# Patient Record
Sex: MALE | Race: WHITE | NOT HISPANIC OR LATINO | Employment: PART TIME | URBAN - NONMETROPOLITAN AREA
[De-identification: names, ages, dates, MRNs, and addresses within clinical notes are randomized per-mention and may not be internally consistent; named-entity substitution may affect disease eponyms.]

---
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
## 2022-09-07 ENCOUNTER — HOSPITAL ENCOUNTER (EMERGENCY)
Facility: OTHER | Age: 22
Discharge: HOME | End: 2022-09-07
Payer: COMMERCIAL

## 2022-09-07 VITALS
OXYGEN SATURATION: 97 % | HEART RATE: 90 BPM | BODY MASS INDEX: 27.69 KG/M2 | HEIGHT: 71 IN | SYSTOLIC BLOOD PRESSURE: 106 MMHG | RESPIRATION RATE: 16 BRPM | WEIGHT: 197.8 LBS | DIASTOLIC BLOOD PRESSURE: 71 MMHG | TEMPERATURE: 98.2 F

## 2022-09-07 NOTE — ED TRIAGE NOTES
Pt states that he is not feeling well.  Pt reports a migraine an abdominal nina.  Pt reports vomiting last night.  The other symptoms started this morning.     Triage Assessment     Row Name 09/07/22 1640       Triage Assessment (Adult)    Airway WDL WDL       Respiratory WDL    Respiratory WDL WDL       Skin Circulation/Temperature WDL    Skin Circulation/Temperature WDL WDL       Cardiac WDL    Cardiac WDL WDL       Peripheral/Neurovascular WDL    Peripheral Neurovascular WDL WDL

## 2023-03-13 ENCOUNTER — OFFICE VISIT (OUTPATIENT)
Dept: FAMILY MEDICINE | Facility: OTHER | Age: 23
End: 2023-03-13
Attending: FAMILY MEDICINE
Payer: COMMERCIAL

## 2023-03-13 VITALS
BODY MASS INDEX: 30.32 KG/M2 | SYSTOLIC BLOOD PRESSURE: 132 MMHG | RESPIRATION RATE: 16 BRPM | OXYGEN SATURATION: 98 % | DIASTOLIC BLOOD PRESSURE: 78 MMHG | HEART RATE: 82 BPM | TEMPERATURE: 97.9 F | WEIGHT: 217.4 LBS

## 2023-03-13 DIAGNOSIS — E66.09 CLASS 1 OBESITY DUE TO EXCESS CALORIES WITHOUT SERIOUS COMORBIDITY WITH BODY MASS INDEX (BMI) OF 30.0 TO 30.9 IN ADULT: ICD-10-CM

## 2023-03-13 DIAGNOSIS — Z72.0 VAPES NICOTINE CONTAINING SUBSTANCE: ICD-10-CM

## 2023-03-13 DIAGNOSIS — R07.89 ATYPICAL CHEST PAIN: Primary | ICD-10-CM

## 2023-03-13 DIAGNOSIS — F41.9 ANXIETY: ICD-10-CM

## 2023-03-13 DIAGNOSIS — E66.811 CLASS 1 OBESITY DUE TO EXCESS CALORIES WITHOUT SERIOUS COMORBIDITY WITH BODY MASS INDEX (BMI) OF 30.0 TO 30.9 IN ADULT: ICD-10-CM

## 2023-03-13 DIAGNOSIS — Z59.9 FINANCIAL PROBLEMS: ICD-10-CM

## 2023-03-13 LAB
D DIMER PPP FEU-MCNC: 0.63 UG/ML FEU (ref 0–0.5)
ERYTHROCYTE [DISTWIDTH] IN BLOOD BY AUTOMATED COUNT: 11.8 % (ref 10–15)
HCT VFR BLD AUTO: 43.1 % (ref 40–53)
HGB BLD-MCNC: 15.2 G/DL (ref 13.3–17.7)
MCH RBC QN AUTO: 30.2 PG (ref 26.5–33)
MCHC RBC AUTO-ENTMCNC: 35.3 G/DL (ref 31.5–36.5)
MCV RBC AUTO: 86 FL (ref 78–100)
PLATELET # BLD AUTO: 222 10E3/UL (ref 150–450)
RBC # BLD AUTO: 5.04 10E6/UL (ref 4.4–5.9)
TSH SERPL DL<=0.005 MIU/L-ACNC: 2.02 UIU/ML (ref 0.3–4.2)
WBC # BLD AUTO: 6.3 10E3/UL (ref 4–11)

## 2023-03-13 PROCEDURE — 36415 COLL VENOUS BLD VENIPUNCTURE: CPT | Mod: ZL | Performed by: FAMILY MEDICINE

## 2023-03-13 PROCEDURE — 93010 ELECTROCARDIOGRAM REPORT: CPT | Performed by: INTERNAL MEDICINE

## 2023-03-13 PROCEDURE — G0463 HOSPITAL OUTPT CLINIC VISIT: HCPCS

## 2023-03-13 PROCEDURE — 85379 FIBRIN DEGRADATION QUANT: CPT | Mod: ZL | Performed by: FAMILY MEDICINE

## 2023-03-13 PROCEDURE — 84443 ASSAY THYROID STIM HORMONE: CPT | Mod: ZL | Performed by: FAMILY MEDICINE

## 2023-03-13 PROCEDURE — 85027 COMPLETE CBC AUTOMATED: CPT | Mod: ZL | Performed by: FAMILY MEDICINE

## 2023-03-13 PROCEDURE — 99215 OFFICE O/P EST HI 40 MIN: CPT | Performed by: FAMILY MEDICINE

## 2023-03-13 PROCEDURE — 93005 ELECTROCARDIOGRAM TRACING: CPT | Performed by: FAMILY MEDICINE

## 2023-03-13 SDOH — ECONOMIC STABILITY - INCOME SECURITY: PROBLEM RELATED TO HOUSING AND ECONOMIC CIRCUMSTANCES, UNSPECIFIED: Z59.9

## 2023-03-13 ASSESSMENT — ANXIETY QUESTIONNAIRES
GAD7 TOTAL SCORE: 1
IF YOU CHECKED OFF ANY PROBLEMS ON THIS QUESTIONNAIRE, HOW DIFFICULT HAVE THESE PROBLEMS MADE IT FOR YOU TO DO YOUR WORK, TAKE CARE OF THINGS AT HOME, OR GET ALONG WITH OTHER PEOPLE: NOT DIFFICULT AT ALL
3. WORRYING TOO MUCH ABOUT DIFFERENT THINGS: NOT AT ALL
1. FEELING NERVOUS, ANXIOUS, OR ON EDGE: NOT AT ALL
5. BEING SO RESTLESS THAT IT IS HARD TO SIT STILL: NOT AT ALL
GAD7 TOTAL SCORE: 1
7. FEELING AFRAID AS IF SOMETHING AWFUL MIGHT HAPPEN: NOT AT ALL
8. IF YOU CHECKED OFF ANY PROBLEMS, HOW DIFFICULT HAVE THESE MADE IT FOR YOU TO DO YOUR WORK, TAKE CARE OF THINGS AT HOME, OR GET ALONG WITH OTHER PEOPLE?: NOT DIFFICULT AT ALL
7. FEELING AFRAID AS IF SOMETHING AWFUL MIGHT HAPPEN: NOT AT ALL
4. TROUBLE RELAXING: NOT AT ALL
6. BECOMING EASILY ANNOYED OR IRRITABLE: SEVERAL DAYS
GAD7 TOTAL SCORE: 1
2. NOT BEING ABLE TO STOP OR CONTROL WORRYING: NOT AT ALL

## 2023-03-13 ASSESSMENT — PATIENT HEALTH QUESTIONNAIRE - PHQ9
SUM OF ALL RESPONSES TO PHQ QUESTIONS 1-9: 1
10. IF YOU CHECKED OFF ANY PROBLEMS, HOW DIFFICULT HAVE THESE PROBLEMS MADE IT FOR YOU TO DO YOUR WORK, TAKE CARE OF THINGS AT HOME, OR GET ALONG WITH OTHER PEOPLE: NOT DIFFICULT AT ALL
SUM OF ALL RESPONSES TO PHQ QUESTIONS 1-9: 1

## 2023-03-13 ASSESSMENT — ENCOUNTER SYMPTOMS: NERVOUS/ANXIOUS: 1

## 2023-03-13 ASSESSMENT — PAIN SCALES - GENERAL: PAINLEVEL: NO PAIN (0)
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
## 2023-03-13 NOTE — NURSING NOTE
"Chief Complaint   Patient presents with     Chest Pain     On and off for 10 years      Anxiety     Patient is here for chest pain that he has had on and off for 10 years. He states since he was taken off medication for stress he has had chest pain. He was on medication over 5 years ago so it is not on his medication history.     Initial /78   Pulse 82   Temp 97.9  F (36.6  C) (Tympanic)   Resp 16   Wt 98.6 kg (217 lb 6.4 oz)   SpO2 98%   BMI 30.32 kg/m   Estimated body mass index is 30.32 kg/m  as calculated from the following:    Height as of 9/7/22: 1.803 m (5' 11\").    Weight as of this encounter: 98.6 kg (217 lb 6.4 oz).  Medication Reconciliation: complete    Diandra De La Paz CMA       FOOD SECURITY SCREENING QUESTIONS:    The next two questions are to help us understand your food security.  If you are feeling you need any assistance in this area, we have resources available to support you today.    Hunger Vital Signs:  Within the past 12 months we worried whether our food would run out before we got money to buy more. Never  Within the past 12 months the food we bought just didn't last and we didn't have money to get more. Never  Diandra De La Paz CMA,LPN on 3/13/2023 at 2:28 PM      "

## 2023-03-13 NOTE — LETTER
"March 13, 2023      Umair Kaba  [address]  [place] [facility] [place] [address]        Dear Umair,     Here is some additional information tactics that can be helpful for management of anxiety.  I would also suggest that you and your fiancé look at the phone lucien called \"Calm\" for some anxiety management strategies that you can do together.                Sincerely,        YAN DASILVA MD          "

## 2023-03-14 LAB
ATRIAL RATE - MUSE: 81 BPM
DIASTOLIC BLOOD PRESSURE - MUSE: NORMAL MMHG
INTERPRETATION ECG - MUSE: NORMAL
P AXIS - MUSE: 46 DEGREES
PR INTERVAL - MUSE: 150 MS
QRS DURATION - MUSE: 116 MS
QT - MUSE: 370 MS
QTC - MUSE: 429 MS
R AXIS - MUSE: 56 DEGREES
SYSTOLIC BLOOD PRESSURE - MUSE: NORMAL MMHG
T AXIS - MUSE: 62 DEGREES
VENTRICULAR RATE- MUSE: 81 BPM

## 2025-04-17 ENCOUNTER — HOSPITAL ENCOUNTER (EMERGENCY)
Facility: OTHER | Age: 25
Discharge: HOME | End: 2025-04-17
Attending: FAMILY MEDICINE

## 2025-04-17 VITALS
RESPIRATION RATE: 16 BRPM | BODY MASS INDEX: 30.8 KG/M2 | HEIGHT: 71 IN | TEMPERATURE: 98.7 F | DIASTOLIC BLOOD PRESSURE: 56 MMHG | WEIGHT: 220 LBS | SYSTOLIC BLOOD PRESSURE: 100 MMHG | OXYGEN SATURATION: 98 % | HEART RATE: 132 BPM
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
## 2025-04-17 DIAGNOSIS — T30.0: ICD-10-CM

## 2025-04-17 PROCEDURE — 99282 EMERGENCY DEPT VISIT SF MDM: CPT | Performed by: FAMILY MEDICINE

## 2025-04-17 PROCEDURE — 99283 EMERGENCY DEPT VISIT LOW MDM: CPT | Performed by: FAMILY MEDICINE

## 2025-04-17 PROCEDURE — 250N000013 HC RX MED GY IP 250 OP 250 PS 637: Performed by: FAMILY MEDICINE

## 2025-04-17 RX ORDER — IBUPROFEN 600 MG/1
600 TABLET, FILM COATED ORAL EVERY 6 HOURS PRN
COMMUNITY
Start: 2025-04-17

## 2025-04-17 RX ORDER — IBUPROFEN 400 MG/1
800 TABLET, FILM COATED ORAL ONCE
Status: COMPLETED | OUTPATIENT
Start: 2025-04-17 | End: 2025-04-17

## 2025-04-17 RX ADMIN — IBUPROFEN 800 MG: 400 TABLET, FILM COATED ORAL at 01:13

## 2025-04-17 ASSESSMENT — COLUMBIA-SUICIDE SEVERITY RATING SCALE - C-SSRS
2. HAVE YOU ACTUALLY HAD ANY THOUGHTS OF KILLING YOURSELF IN THE PAST MONTH?: NO
6. HAVE YOU EVER DONE ANYTHING, STARTED TO DO ANYTHING, OR PREPARED TO DO ANYTHING TO END YOUR LIFE?: NO
1. IN THE PAST MONTH, HAVE YOU WISHED YOU WERE DEAD OR WISHED YOU COULD GO TO SLEEP AND NOT WAKE UP?: NO

## 2025-04-17 ASSESSMENT — ENCOUNTER SYMPTOMS
CHEST TIGHTNESS: 0
CHILLS: 0
FEVER: 0
SHORTNESS OF BREATH: 0

## 2025-04-17 ASSESSMENT — ACTIVITIES OF DAILY LIVING (ADL): ADLS_ACUITY_SCORE: 41

## 2025-04-17 NOTE — ED TRIAGE NOTES
Pt arrives from home with c/o redness on arms, legs and torso. Pt states he was at the [facility]anning [facility] for the first time for 20 minutes and pain has gotten worse since being home.

## 2025-04-17 NOTE — ED PROVIDER NOTES
History     Chief Complaint   Patient presents with    Burn     Generalized sunburn     HPI  Umair Kaba is a 24 year old male who presents to the emergency department with redness on arms legs torso, Monday he went into the [facility] for the first time and spent 20 minutes in there.  He is fair skinned.  Prone to sunburn.  No allergies, no history of hives or skin reactions.  No new medications.  Redness is only on exposed areas    Reviewed nurses notes below  Pt arrives from home with c/o redness on arms, legs and torso. Pt states he was at the [facility] for the first time for 20 minutes and pain has gotten worse since being home.   Allergies:  No Known Allergies    Problem List:    Patient Active Problem List    Diagnosis Date Noted    Behavior concern 01/17/2011     Priority: Medium    ADHD 09/10/2009     Priority: Medium     Overview:   Meds used:  ritalin, concerta, straterra, clonidine, adderall, guanfacine,   wellbutrin          Past Medical History:    Past Medical History:   Diagnosis Date    Attention-deficit hyperactivity disorder        Past Surgical History:    Past Surgical History:   Procedure Laterality Date    CIRCUMCISION      No Comments Provided       Family History:    Family History   Problem Relation Age of Onset    Obesity Mother         Obesity, gets chest pains occassionally.    Heart Disease Maternal Grandfather     Diabetes Type 1 Maternal Half-Brother        Social History:  Marital Status:  Single [1]  Social History     Tobacco Use    Smoking status: Every Day     Types: Vaping Device    Smokeless tobacco: Never   Vaping Use    Vaping status: Every Day    Substances: Nicotine, 30%   Substance Use Topics    Alcohol use: No    Drug use: No        Medications:    ibuprofen (ADVIL/MOTRIN) 600 MG tablet          Review of Systems   Constitutional:  Negative for chills and fever.   Respiratory:  Negative for chest tightness and shortness of breath.        Physical Exam   BP:  "100/56  Pulse: (!) 132  Temp: 98.7  F (37.1  C)  Resp: 16  Height: 180.3 cm (5' 11\")  Weight: 99.8 kg (220 lb)      Physical Exam  Vitals and nursing note reviewed.   Constitutional:       Appearance: Normal appearance.   Cardiovascular:      Rate and Rhythm: Normal rate.   Pulmonary:      Effort: Pulmonary effort is normal.   Musculoskeletal:      Right lower leg: No edema.      Left lower leg: No edema.   Skin:     Findings: Erythema present.   Neurological:      General: No focal deficit present.      Mental Status: He is alert.       Erythema without any blistering or peeling and ultraviolet light exposed areas.  No hives, pustules, blisters, bulla, wheals or papules.  No significant induration    Serial assessments in the ED, no progression of rash.  After calming down in the ED and monitoring pain control his pulse rate improved to 101 on my repeat exam    No results found for this or any previous visit (from the past 24 hours).    Medications   ibuprofen (ADVIL/MOTRIN) tablet 800 mg (800 mg Oral $Given 4/17/25 0113)       Assessments & Plan (with Medical Decision Making)     I have reviewed the nursing notes.    I have reviewed the findings, diagnosis, plan and need for follow up with the patient.    Medical Decision Making  The patient's presentation was of low complexity (an acute and uncomplicated illness or injury).    The patient's evaluation involved:  history and exam without other MDM data elements    The patient's management necessitated moderate risk (prescription drug management including medications given in the ED).    He was given information on sunburn and tanning beds.  Discussed other photo sensitivities that are not necessarily burns such as photodermatitis, sun induced hives etc.  He did not have an appearance consistent with these clinical entities.  Encouraged him to return for reassessment however with development of large boils, bullae, hives or significant blistering.  Scheduled " ibuprofen, may use aloe and calamine as well.  Patient verbalized understanding    New Prescriptions    IBUPROFEN (ADVIL/MOTRIN) 600 MG TABLET    Take 1 tablet (600 mg) by mouth every 6 hours as needed for moderate pain, mild pain or inflammatory pain.       Final diagnoses:   Burn of skin due to exposure to artificial ultraviolet radiation       4/17/2025   [facility]  04/17/25 0592

## 2025-07-06 ENCOUNTER — APPOINTMENT (OUTPATIENT)
Dept: GENERAL RADIOLOGY | Facility: OTHER | Age: 25
End: 2025-07-06
Attending: EMERGENCY MEDICINE

## 2025-07-06 ENCOUNTER — HOSPITAL ENCOUNTER (EMERGENCY)
Facility: OTHER | Age: 25
Discharge: HOME | End: 2025-07-06
Attending: EMERGENCY MEDICINE

## 2025-07-06 VITALS
SYSTOLIC BLOOD PRESSURE: 149 MMHG | RESPIRATION RATE: 20 BRPM | OXYGEN SATURATION: 96 % | TEMPERATURE: 97.7 F | WEIGHT: 223 LBS | BODY MASS INDEX: 31.1 KG/M2 | DIASTOLIC BLOOD PRESSURE: 84 MMHG | HEART RATE: 75 BPM

## 2025-07-06 DIAGNOSIS — S93.401A SPRAIN OF RIGHT ANKLE, UNSPECIFIED LIGAMENT, INITIAL ENCOUNTER: ICD-10-CM

## 2025-07-06 PROCEDURE — 73610 X-RAY EXAM OF ANKLE: CPT | Mod: RT

## 2025-07-06 PROCEDURE — 73610 X-RAY EXAM OF ANKLE: CPT | Mod: 26 | Performed by: RADIOLOGY

## 2025-07-06 PROCEDURE — 99283 EMERGENCY DEPT VISIT LOW MDM: CPT | Performed by: EMERGENCY MEDICINE

## 2025-07-06 PROCEDURE — 73630 X-RAY EXAM OF FOOT: CPT | Mod: RT

## 2025-07-06 PROCEDURE — 73630 X-RAY EXAM OF FOOT: CPT | Mod: 26 | Performed by: RADIOLOGY

## 2025-07-06 PROCEDURE — 250N000013 HC RX MED GY IP 250 OP 250 PS 637: Performed by: EMERGENCY MEDICINE

## 2025-07-06 RX ORDER — OXYCODONE HYDROCHLORIDE 5 MG/1
5 TABLET ORAL ONCE
Refills: 0 | Status: COMPLETED | OUTPATIENT
Start: 2025-07-06 | End: 2025-07-06

## 2025-07-06 RX ORDER — GINSENG 100 MG
500 CAPSULE ORAL ONCE
Status: DISCONTINUED | OUTPATIENT
Start: 2025-07-06 | End: 2025-07-06

## 2025-07-06 RX ADMIN — IBUPROFEN 600 MG: 200 TABLET, FILM COATED ORAL at 00:10

## 2025-07-06 ASSESSMENT — COLUMBIA-SUICIDE SEVERITY RATING SCALE - C-SSRS
1. IN THE PAST MONTH, HAVE YOU WISHED YOU WERE DEAD OR WISHED YOU COULD GO TO SLEEP AND NOT WAKE UP?: NO
6. HAVE YOU EVER DONE ANYTHING, STARTED TO DO ANYTHING, OR PREPARED TO DO ANYTHING TO END YOUR LIFE?: NO
2. HAVE YOU ACTUALLY HAD ANY THOUGHTS OF KILLING YOURSELF IN THE PAST MONTH?: NO

## 2025-07-06 ASSESSMENT — ACTIVITIES OF DAILY LIVING (ADL): ADLS_ACUITY_SCORE: 41

## 2025-07-06 ASSESSMENT — ENCOUNTER SYMPTOMS
ARTHRALGIAS: 1
NECK PAIN: 0
JOINT SWELLING: 1

## 2025-07-06 NOTE — ED TRIAGE NOTES
Pt arrives with concerns of a right ankle and foot injury after [place] and [place] his right foot on a rock. Pt has been unable to bear weight on extremely. Tylenol one hour prior to arrival.   BP (!) 149/84   Pulse 75   Temp 97.7  F (36.5  C) (Tympanic)   Resp 20   SpO2 98%     Triage Assessment (Adult)       Row Name 07/06/25 0001          Triage Assessment    Airway WDL WDL        Respiratory WDL    Respiratory WDL WDL        Skin Circulation/Temperature WDL    Skin Circulation/Temperature WDL X        Cardiac WDL    Cardiac WDL WDL        Peripheral/Neurovascular WDL    Peripheral Neurovascular WDL X        Cognitive/Neuro/Behavioral WDL    Cognitive/Neuro/Behavioral WDL WDL

## 2025-07-06 NOTE — ED PROVIDER NOTES
History     Chief Complaint   Patient presents with    Trauma     HPI  Umair Kaba is a 24 year old male who was using a rope swing over water when he jumped in he struck a rock with his left foot and developed lateral malleolar pain swelling and tenderness and as well as some abrasions to the right knee.  He has pain with weightbearing.  No other injuries to the extremities head or neck.  He has no gross deformity    Allergies:  No Known Allergies    Problem List:    Patient Active Problem List    Diagnosis Date Noted    Behavior concern 01/17/2011     Priority: Medium    ADHD 09/10/2009     Priority: Medium     Overview:   Meds used:  ritalin, concerta, straterra, clonidine, adderall, guanfacine,   wellbutrin          Past Medical History:    Past Medical History:   Diagnosis Date    Attention-deficit hyperactivity disorder        Past Surgical History:    Past Surgical History:   Procedure Laterality Date    CIRCUMCISION      No Comments Provided       Family History:    Family History   Problem Relation Age of Onset    Obesity Mother         Obesity, gets chest pains occassionally.    Heart Disease Maternal Grandfather     Diabetes Type 1 Maternal Half-Brother        Social History:  Marital Status:  Single [1]  Social History     Tobacco Use    Smoking status: Every Day     Types: Vaping Device    Smokeless tobacco: Never   Vaping Use    Vaping status: Every Day    Substances: Nicotine, 30%   Substance Use Topics    Alcohol use: No    Drug use: No        Medications:    ibuprofen (ADVIL/MOTRIN) 600 MG tablet          Review of Systems   Musculoskeletal:  Positive for arthralgias and joint swelling. Negative for neck pain.   All other systems reviewed and are negative.      Physical Exam   BP: (!) 149/84  Pulse: 75  Temp: 97.7  F (36.5  C)  Resp: 20  Weight: 101.2 kg (223 lb)  SpO2: 98 %      Physical Exam  Vitals and nursing note reviewed.   Constitutional:       General: He is not in acute  distress.  HENT:      Head: Atraumatic.   Pulmonary:      Effort: Pulmonary effort is normal.      Breath sounds: Normal breath sounds.   Musculoskeletal:      Cervical back: Neck supple.        Feet:       Comments: Lateral malleolar swelling, the ankle joint is stable.  A few abrasions on the leg proximal to the ankle and distal to the knee.  Also tenderness to palpation over the forefoot   Skin:     General: Skin is warm.   Neurological:      General: No focal deficit present.      Mental Status: He is alert and oriented to person, place, and time.   Psychiatric:         Mood and Affect: Mood normal.         Behavior: Behavior normal.         ED Course        Procedures              Recent Results (from the past 24 hours)   XR Ankle Port Right G/E 3 Views    Narrative    EXAM: XR ANKLE PORT RIGHT G/E 3 VIEWS  LOCATION: [facility]  DATE: 7/6/2025    INDICATION: fall from swing, lateral pain and swelling  COMPARISON: None.      Impression    IMPRESSION: Normal joint spaces and alignment. No fracture. Soft tissue swelling over the lateral malleolus.   XR Foot Port Right 3 Views    Narrative    EXAM: XR FOOT PORT RIGHT 3 VIEWS  LOCATION: [facility]  DATE: 7/6/2025    INDICATION: pain after fall  COMPARISON: None.      Impression    IMPRESSION: Normal joint spaces and alignment. No fracture.       Medications   oxyCODONE (ROXICODONE) tablet 5 mg (5 mg Oral Not Given 7/6/25 0010)   ibuprofen (ADVIL/MOTRIN) tablet 600 mg (600 mg Oral $Given 7/6/25 0010)     XR Foot Port Right 3 Views   Final Result   IMPRESSION: Normal joint spaces and alignment. No fracture.      XR Ankle Port Right G/E 3 Views   Final Result   IMPRESSION: Normal joint spaces and alignment. No fracture. Soft tissue swelling over the lateral malleolus.          Assessments & Plan (with Medical Decision Making)   24-year-old male was on a rope swing over water when he dove in feet first and struck a rock with  his right foot.  He comes in with lateral malleolar pain and swelling and pain over the forefoot.  X-rays of the ankle are normal except for soft tissue swelling x-ray of the foot is negative as well.  He was Ace wrapped and put in a gel ankle splint and given pain medication.  He was informed there was no acute fracture but he would have pain and swelling recommend Tylenol and ibuprofen for pain and elevate the leg to help reduce swelling and follow-up with your primary care provider  I have reviewed the nursing notes.    I have reviewed the findings, diagnosis, plan and need for follow up with the patient.          Medical Decision Making  The patient's presentation was of low complexity (an acute and uncomplicated illness or injury).    The patient's evaluation involved:  ordering and/or review of 2 test(s) in this encounter (ankle x-ray, foot x-ray both negative)    The patient's management necessitated moderate risk (prescription drug management including medications given in the ED).        New Prescriptions    No medications on file       Final diagnoses:   Sprain of right ankle, unspecified ligament, initial encounter       7/6/2025   [facility] HospitalManjeet leslie MD  07/06/25 0124

## 2025-07-06 NOTE — Clinical Note
Umair CastroAaronar was seen and treated in our emergency department on 7/6/2025.  He may return to work on 07/07/2025.  No prolonged standing until able to bear weight on affected limb.     If you have any questions or concerns, please don't hesitate to call.      Linda Goss, RN

## 2025-07-06 NOTE — DISCHARGE INSTRUCTIONS
The x-ray shows no fracture or dislocation.  Use Ace wrap and wear the splint when ambulating  Use Tylenol and ibuprofen for pain  Elevate the leg when possible

## 2025-07-09 NOTE — ED NOTES
Chart opened due to received incomplete paperwork related to DME.     Marcelina BRISENO RN 7/9/2025 at [id number]

## (undated) RX ORDER — OXYCODONE HYDROCHLORIDE 5 MG/1
TABLET ORAL
Status: DISPENSED
Start: 2025-07-06

## (undated) RX ORDER — IBUPROFEN 400 MG/1
TABLET, FILM COATED ORAL
Status: DISPENSED
Start: 2025-04-17

## (undated) RX ORDER — IBUPROFEN 200 MG
TABLET ORAL
Status: DISPENSED
Start: 2025-07-06